# Patient Record
(demographics unavailable — no encounter records)

---

## 2018-06-15 NOTE — CT
HEAD CT WITHOUT CONTRAST:

6/15/18

 

COMPARISON:  

None.

 

HISTORY: 

Anxiety and mental status changes, trauma.

 

TECHNIQUE:  

Serial axial CT imaging at 5 mm intervals from vertex through skull base without contrast.

 

FINDINGS:  

There is mild mucosal thickening involving bilateral maxillary sinuses and the ethmoid air cells bila
terally.

 

No displaced calvarial fracture is seen. 

 

No intracranial hemorrhage, midline shift, mass effect, or ventricular enlargement. There is subcorti
macarena hypodensity in the frontal region inferolaterally on the left suggesting prior insult. Similar hy
podensity noted in the anterior aspect of the temporal lobe on the right. These findings may be on th
e basis of remote contusion.

 

IMPRESSION:  

Chronic findings as described above. No intracranial hemorrhage or displaced calvarial fracture. 

 

POS: [facility]

## 2018-06-15 NOTE — RAD
PORTABLE UPRIGHT FRONTAL CHEST RADIOGRAPH

6/15/18

 

COMPARISON:  

4/29/12

 

HISTORY: 

Head congestion and cold sweats.

 

FINDINGS:  

No pneumothorax, pleural fluid, focal consolidation, or alveolar edema. 

 

IMPRESSION:  

No acute findings. 

 

POS: [facility]